# Patient Record
Sex: FEMALE | Race: WHITE | HISPANIC OR LATINO
[De-identification: names, ages, dates, MRNs, and addresses within clinical notes are randomized per-mention and may not be internally consistent; named-entity substitution may affect disease eponyms.]

---

## 2018-10-01 ENCOUNTER — APPOINTMENT (OUTPATIENT)
Dept: BREAST CENTER | Facility: CLINIC | Age: 53
End: 2018-10-01

## 2019-03-11 ENCOUNTER — APPOINTMENT (OUTPATIENT)
Dept: BREAST CENTER | Facility: CLINIC | Age: 54
End: 2019-03-11
Payer: MEDICAID

## 2019-03-11 VITALS
BODY MASS INDEX: 31.92 KG/M2 | DIASTOLIC BLOOD PRESSURE: 72 MMHG | HEART RATE: 67 BPM | WEIGHT: 187 LBS | SYSTOLIC BLOOD PRESSURE: 147 MMHG | HEIGHT: 64 IN

## 2019-03-11 DIAGNOSIS — Z78.9 OTHER SPECIFIED HEALTH STATUS: ICD-10-CM

## 2019-03-11 DIAGNOSIS — Z87.891 PERSONAL HISTORY OF NICOTINE DEPENDENCE: ICD-10-CM

## 2019-03-11 DIAGNOSIS — Z86.39 PERSONAL HISTORY OF OTHER ENDOCRINE, NUTRITIONAL AND METABOLIC DISEASE: ICD-10-CM

## 2019-03-11 PROCEDURE — 99215 OFFICE O/P EST HI 40 MIN: CPT

## 2019-03-11 RX ORDER — AMLODIPINE BESYLATE 5 MG/1
TABLET ORAL
Refills: 0 | Status: ACTIVE | COMMUNITY

## 2019-03-11 RX ORDER — ASPIRIN 81 MG
81 TABLET, DELAYED RELEASE (ENTERIC COATED) ORAL
Refills: 0 | Status: ACTIVE | COMMUNITY

## 2019-03-11 RX ORDER — CHROMIUM 200 MCG
TABLET ORAL
Refills: 0 | Status: ACTIVE | COMMUNITY

## 2019-03-11 RX ORDER — METHOTREXATE 2.5 MG/1
TABLET ORAL
Refills: 0 | Status: ACTIVE | COMMUNITY

## 2019-03-11 RX ORDER — LEVOTHYROXINE SODIUM 0.17 MG/1
TABLET ORAL
Refills: 0 | Status: ACTIVE | COMMUNITY

## 2019-03-11 RX ORDER — SERTRALINE HYDROCHLORIDE 100 MG/1
100 TABLET, FILM COATED ORAL
Refills: 0 | Status: ACTIVE | COMMUNITY

## 2019-03-11 RX ORDER — BUPROPION HYDROCHLORIDE 150 MG/1
150 TABLET, EXTENDED RELEASE ORAL
Refills: 0 | Status: ACTIVE | COMMUNITY

## 2019-03-11 NOTE — PHYSICAL EXAM
[Normocephalic] : normocephalic [Atraumatic] : atraumatic [Supple] : supple [No Supraclavicular Adenopathy] : no supraclavicular adenopathy [Examined in the supine and seated position] : examined in the supine and seated position [Asymmetrical] : asymmetrical [No dominant masses] : no dominant masses in right breast  [No dominant masses] : no dominant masses left breast [No Nipple Retraction] : no left nipple retraction [No Nipple Discharge] : no left nipple discharge [No Axillary Lymphadenopathy] : no left axillary lymphadenopathy [No Edema] : no edema [No Rashes] : no rashes [No Ulceration] : no ulceration [de-identified] : R>L [de-identified] : pinkish coloring periareolar central which resolves in supine position

## 2019-03-11 NOTE — ASSESSMENT
[FreeTextEntry1] : 55 yo high risk patient\par due for bilateral sono now\par plan MRI 5/2019 then JULY/AUG 2020\par We reviewed risk reduction strategies including maintaining a BMI <25, limiting red meat intake and alcoholic beverages to 3 per week and exercise (150 min/ week low intensity or 75 min/week high intensity). And maintaining a normal vitamin D level.\par \par f/u with me 6 months\par She knows to call or return sooner should any concerns or questions arise.\par

## 2019-03-11 NOTE — REVIEW OF SYSTEMS
[Recent Weight Gain (___ Lbs)] : recent [unfilled] ~Ulb weight gain [Palpitations] : palpitations [Heartburn] : heartburn [Joint Pain] : joint pain [Joint Swelling] : joint swelling [Anxiety] : anxiety [Depression] : depression [Hot Flashes] : hot flashes [Easy Bruising] : a tendency for easy bruising [Negative] : Heme/Lymph

## 2019-03-11 NOTE — HISTORY OF PRESENT ILLNESS
[FreeTextEntry1] : This is a 54 year old woman followed for high risk. She is  s/p right u/s bx 5/2012 [facility]: [facility]  Patient is s/p right breast MRI guided biopsy on 06/13/2016. Pathology showed benign mammary tissue with stromal fibrosclerosis. Her mother had bca for 2nd time in 03/2017. She states her mother was not genetically tested. Her  passed away August 2013. Her mother passed MARCH 2019.\par \par Patient had a CT scan on 01/30/2018, which showed nodularity along the inferior aspect of the breasts, further mammogram and usd recommended. Patient had diagnostic mammogram on 02/23/2018 and b/l breast usd on 03/02/2018 which showed no evidence of malignancy. Patient is schedule for breast MRI at the end of March. Patient has no breast complaints today.   \par \par She does SBE irregularly.\par She has not noticed a change in her breast or a breast lump.\par She has not noticed a change in her nipple or nipple area.\par She has not noticed a change in the skin of the breast.\par She is not experiencing nipple discharge.\par She is not experiencing breast pain.\par She has not noticed a lump or lymph node under the armpit. \par \par BREAST CANCER RISK FACTORS\par Menarche: 12\par Date of LMP: 3/7/2016\par Menopause: post \par Grav: 2     Para: 2\par Age at first live birth: 23\par Nursed: Yes\par Hysterectomy: No\par Oophorectomy: No\par OCP: yes 5 y\par HRT: No\par Last pap/pelvic exam: 01/2018 WNL; has appt 4/2019\par Related family history: Mother BCA at age 64 and 81yo, maternal uncle pancreatic CA 36\par Ashkenazi: No\par Mastery lifetime risk:  BRCAPRO 9.6%,  TC6 15.3%, TC7 37.4%, Maki 21.4%, Tung 7.3%; 5 year risk range: 1.1-5.2%; Maki 2.8%\par BRCA testing: Yes, ALISIA negative 8/2016\par  \par Last mammogram:    02/27/2019     Location: [place]\par Report reviewed.                                 Images reviewed on PACS.\par Results: Birads 2\par Heterogeneously dense breast. No evidence of malignancy\par \par Last ultrasound:   03/02/2018     Location: [place]\par Report reviewed.                                 Images reviewed on PACS. \par Results: Birads 2\par No evidence of malignancy\par \par Last MRI:     3/19/2018                                    Location: [facility] [place]\par Report reviewed.                                                   Images reviewed on PACS. \par Results: Birads 2\par No evidence of malignancy

## 2019-04-03 ENCOUNTER — RESULT REVIEW (OUTPATIENT)
Age: 54
End: 2019-04-03

## 2019-04-08 ENCOUNTER — MESSAGE (OUTPATIENT)
Age: 54
End: 2019-04-08

## 2019-09-06 ENCOUNTER — APPOINTMENT (OUTPATIENT)
Dept: VASCULAR SURGERY | Facility: CLINIC | Age: 54
End: 2019-09-06
Payer: MEDICAID

## 2019-09-06 PROCEDURE — 99214 OFFICE O/P EST MOD 30 MIN: CPT

## 2019-10-09 ENCOUNTER — APPOINTMENT (OUTPATIENT)
Dept: BREAST CENTER | Facility: CLINIC | Age: 54
End: 2019-10-09
Payer: MEDICAID

## 2019-10-09 VITALS
DIASTOLIC BLOOD PRESSURE: 70 MMHG | HEIGHT: 64 IN | WEIGHT: 183 LBS | BODY MASS INDEX: 31.24 KG/M2 | HEART RATE: 71 BPM | SYSTOLIC BLOOD PRESSURE: 132 MMHG

## 2019-10-09 PROCEDURE — 99215 OFFICE O/P EST HI 40 MIN: CPT

## 2019-10-09 RX ORDER — AMLODIPINE BESYLATE 2.5 MG/1
2.5 TABLET ORAL
Qty: 30 | Refills: 0 | Status: ACTIVE | COMMUNITY
Start: 2019-05-21

## 2019-10-09 RX ORDER — ATORVASTATIN CALCIUM 10 MG/1
10 TABLET, FILM COATED ORAL
Qty: 30 | Refills: 0 | Status: ACTIVE | COMMUNITY
Start: 2019-07-21

## 2019-10-09 RX ORDER — ATORVASTATIN CALCIUM 80 MG/1
TABLET, FILM COATED ORAL
Refills: 0 | Status: DISCONTINUED | COMMUNITY
End: 2019-10-09

## 2019-10-09 NOTE — PHYSICAL EXAM
[Normocephalic] : normocephalic [Atraumatic] : atraumatic [Supple] : supple [No Supraclavicular Adenopathy] : no supraclavicular adenopathy [Examined in the supine and seated position] : examined in the supine and seated position [Asymmetrical] : asymmetrical [No dominant masses] : no dominant masses in right breast  [No dominant masses] : no dominant masses left breast [No Nipple Retraction] : no left nipple retraction [No Nipple Discharge] : no left nipple discharge [No Axillary Lymphadenopathy] : no left axillary lymphadenopathy [No Edema] : no edema [No Rashes] : no rashes [No Ulceration] : no ulceration [de-identified] : R>L [de-identified] : pinkish coloring periareolar central which resolves in supine position

## 2019-10-09 NOTE — ASSESSMENT
[FreeTextEntry1] : 55 yo high risk patient\par plan bilateral mg/ sono FEB 2020\par plan MRI AUG 2020\par We reviewed risk reduction strategies including maintaining a BMI <25, limiting red meat intake and alcoholic beverages to 3 per week and exercise (150 min/ week low intensity or 75 min/week high intensity). And maintaining a normal vitamin D level.\par \par f/u with me 6 months\par She knows to call or return sooner should any concerns or questions arise.\par

## 2019-10-09 NOTE — HISTORY OF PRESENT ILLNESS
[FreeTextEntry1] : This is a 54 year old woman followed for high risk. She is  s/p right u/s bx 5/2012 path: [facility].  Patient is s/p right breast MRI guided biopsy on 06/13/2016. Pathology showed benign mammary tissue with stromal fibrosclerosis. Her mother had bca for 2nd time in 03/2017. She states her mother was not genetically tested. Her  passed away August 2013. Her mother passed MARCH 2019.\par \par Patient had a CT scan on 01/30/2018, which showed nodularity along the inferior aspect of the breasts, further mammogram and usd recommended. Patient had diagnostic mammogram on 02/23/2018 and b/l breast usd on 03/02/2018 which showed no evidence of malignancy. Patient is schedule for breast MRI at the end of March. Patient has no breast complaints today.   \par \par She does SBE irregularly.\par She has not noticed a change in her breast or a breast lump.\par She has not noticed a change in her nipple or nipple area.\par She has not noticed a change in the skin of the breast.\par She is not experiencing nipple discharge.\par She is not experiencing breast pain.\par She has not noticed a lump or lymph node under the armpit. \par \par BREAST CANCER RISK FACTORS\par Menarche: 12\par Date of LMP: 3/7/2016\par Menopause: post \par Grav: 2     Para: 2\par Age at first live birth: 23\par Nursed: Yes\par Hysterectomy: No\par Oophorectomy: No\par OCP: yes 5 y\par HRT: No\par Last pap/pelvic exam: 4/2019 WNL\par Related family history: Mother BCA at age 64 and 83yo, maternal uncle pancreatic CA 36\par Ashkenazi: No\par Mastery lifetime risk:  BRCAPRO 9.6%,  TC6 15.3%, TC7 37.4%, Maki 21.4%, Tung 7.3%; 5 year risk range: 1.1-5.2%; Maki 2.8%\par BRCA testing: Yes, LOIDASK negative 8/2016\par  \par Last mammogram:    02/27/2019     Location: [place]\par Report reviewed.                                 Images reviewed on PACS.\par Results: Birads 2\par Heterogeneously dense breast. No evidence of malignancy\par \par Last ultrasound:   4/9/2019     Location: [place]\par Report reviewed.                                 Images reviewed on PACS. \par Results: Birads 2\par No evidence of malignancy\par \par Last MRI:     6/12/2019                                    Location: [place]\par Report reviewed.                                                   Images reviewed on PACS. \par Results: Birads 2\par No evidence of malignancy

## 2020-06-25 ENCOUNTER — RESULT REVIEW (OUTPATIENT)
Age: 55
End: 2020-06-25

## 2020-06-29 ENCOUNTER — APPOINTMENT (OUTPATIENT)
Dept: BREAST CENTER | Facility: CLINIC | Age: 55
End: 2020-06-29
Payer: COMMERCIAL

## 2020-06-29 VITALS
BODY MASS INDEX: 30.56 KG/M2 | WEIGHT: 179 LBS | HEIGHT: 64 IN | DIASTOLIC BLOOD PRESSURE: 75 MMHG | HEART RATE: 85 BPM | SYSTOLIC BLOOD PRESSURE: 133 MMHG

## 2020-06-29 DIAGNOSIS — Z00.00 ENCOUNTER FOR GENERAL ADULT MEDICAL EXAMINATION W/OUT ABNORMAL FINDINGS: ICD-10-CM

## 2020-06-29 DIAGNOSIS — Z87.39 PERSONAL HISTORY OF OTHER DISEASES OF THE MUSCULOSKELETAL SYSTEM AND CONNECTIVE TISSUE: ICD-10-CM

## 2020-06-29 PROCEDURE — 99215 OFFICE O/P EST HI 40 MIN: CPT

## 2020-06-29 NOTE — ASSESSMENT
[FreeTextEntry1] : 54 yo high risk patient\par plan bilateral mg/ sono JUN 2021\par plan MRI OCT 2020\par We reviewed risk reduction strategies including maintaining a BMI <25, limiting red meat intake and alcoholic beverages to 3 per week and exercise (150 min/ week low intensity or 75 min/week high intensity). And maintaining a normal vitamin D level.\par \par f/u with me 6 months\par She knows to call or return sooner should any concerns or questions arise.\par

## 2020-06-29 NOTE — PHYSICAL EXAM
[Normocephalic] : normocephalic [No Supraclavicular Adenopathy] : no supraclavicular adenopathy [Supple] : supple [Atraumatic] : atraumatic [No dominant masses] : no dominant masses in right breast  [Asymmetrical] : asymmetrical [Examined in the supine and seated position] : examined in the supine and seated position [No Nipple Retraction] : no left nipple retraction [No dominant masses] : no dominant masses left breast [No Axillary Lymphadenopathy] : no left axillary lymphadenopathy [No Nipple Discharge] : no left nipple discharge [No Edema] : no edema [No Rashes] : no rashes [No Ulceration] : no ulceration [de-identified] : R>L [de-identified] : pinkish coloring periareolar central which resolves in supine position

## 2020-06-29 NOTE — HISTORY OF PRESENT ILLNESS
[FreeTextEntry1] : This is a 55 year old woman followed for high risk. She is  s/p right u/s bx 5/2012 path: [facility].  Patient is s/p right breast MRI guided biopsy on 06/13/2016. Pathology showed benign mammary tissue with stromal fibrosclerosis. Her mother had bca for 2nd time in 03/2017. She states her mother was not genetically tested. Her  passed away August 2013. Her mother passed MARCH 2019.\par \par Patient had a CT scan on 01/30/2018, which showed nodularity along the inferior aspect of the breasts, further mammogram and usd recommended. Patient had diagnostic mammogram on 02/23/2018 and b/l breast usd on 03/02/2018 which showed no evidence of malignancy. Patient is schedule for breast MRI at the end of March. Patient has no breast complaints today.   \par \par She does SBE irregularly.\par She has not noticed a change in her breast or a breast lump.\par She has not noticed a change in her nipple or nipple area.\par She has not noticed a change in the skin of the breast.\par She is not experiencing nipple discharge.\par She is not experiencing breast pain.\par She has not noticed a lump or lymph node under the armpit. \par \par BREAST CANCER RISK FACTORS\par Menarche: 12\par Date of LMP: 3/7/2016\par Menopause: post \par Grav: 2     Para: 2\par Age at first live birth: 23\par Nursed: Yes\par Hysterectomy: No\par Oophorectomy: No\par OCP: yes 5 y\par HRT: No\par Last pap/pelvic exam: 6/25/2020 pap results pending\par Related family history: Mother BCA at age 64 and 81yo, maternal uncle pancreatic CA 36\par Ashkenazi: No\par Mastery lifetime risk:  BRCAPRO 9.6%,  TC6 15.3%, TC7 37.4%, Maki 21.4%, Tung 7.3%; 5 year risk range: 1.1-5.2%; Maki 2.8%\par BRCA testing: Yes, ALISIA negative 8/2016\par  \par Last mammogram:  6/17/2020     [place]\par Report reviewed.                                 Images reviewed on PACS.\par Results: Birads 2\par Heterogeneously dense breast. No evidence of malignancy\par \par Last ultrasound:  6/17/2020     [place]\par Report reviewed.                                 Images reviewed on PACS. \par Results: Birads 2\par No evidence of malignancy\par \par Last MRI:     6/12/2019                                    Location: [place]\par Report reviewed.                                                   Images reviewed on PACS. \par Results: Birads 2\par No evidence of malignancy

## 2020-07-20 ENCOUNTER — TRANSCRIPTION ENCOUNTER (OUTPATIENT)
Age: 55
End: 2020-07-20

## 2020-12-21 ENCOUNTER — APPOINTMENT (OUTPATIENT)
Dept: BREAST CENTER | Facility: CLINIC | Age: 55
End: 2020-12-21
Payer: COMMERCIAL

## 2020-12-21 VITALS
DIASTOLIC BLOOD PRESSURE: 71 MMHG | BODY MASS INDEX: 30.56 KG/M2 | SYSTOLIC BLOOD PRESSURE: 137 MMHG | WEIGHT: 179 LBS | HEART RATE: 64 BPM | HEIGHT: 64 IN

## 2020-12-21 PROCEDURE — 99215 OFFICE O/P EST HI 40 MIN: CPT

## 2020-12-21 PROCEDURE — 99072 ADDL SUPL MATRL&STAF TM PHE: CPT

## 2020-12-21 NOTE — PHYSICAL EXAM
[Normocephalic] : normocephalic [Atraumatic] : atraumatic [Supple] : supple [No Supraclavicular Adenopathy] : no supraclavicular adenopathy [Examined in the supine and seated position] : examined in the supine and seated position [Asymmetrical] : asymmetrical [No dominant masses] : no dominant masses in right breast  [No dominant masses] : no dominant masses left breast [No Nipple Retraction] : no left nipple retraction [No Nipple Discharge] : no left nipple discharge [No Axillary Lymphadenopathy] : no right axillary lymphadenopathy [No Edema] : no edema [No Rashes] : no rashes [No Ulceration] : no ulceration [de-identified] : R>L [de-identified] : pinkish coloring periareolar central which resolves in supine position [de-identified] : soft palpable nodes left axilla

## 2020-12-21 NOTE — ASSESSMENT
[FreeTextEntry1] : 54 yo high risk patient\par plan bilateral mg/ sono JUN 2021\par left axillary u/s asap\par i will call after u/s review\par plan MRI DEC 2021\par We reviewed risk reduction strategies including maintaining a BMI <25, limiting red meat intake and alcoholic beverages to 3 per week and exercise (150 min/ week low intensity or 75 min/week high intensity). And maintaining a normal vitamin D level.\par \par f/u with me 6 months\par She knows to call or return sooner should any concerns or questions arise.\par

## 2020-12-21 NOTE — HISTORY OF PRESENT ILLNESS
[FreeTextEntry1] : This is a 55 year old woman followed for high risk. She is  s/p right u/s bx 5/2012 path: \[facility]\"".  Patient is s/p right breast MRI guided biopsy on 06/13/2016. Pathology showed benign mammary tissue with stromal fibrosclerosis. Her mother had bca for 2nd time in 03/2017. She states her mother was not genetically tested. Her  passed away August 2013. Her mother passed MARCH 2019.\par \par Patient had a CT scan on 01/30/2018, which showed nodularity along the inferior aspect of the breasts, further mammogram and usd recommended. Patient had diagnostic mammogram on 02/23/2018 and b/l breast usd on 03/02/2018 which showed no evidence of malignancy. Patient is schedule for breast MRI at the end of March. Patient has no breast complaints today.   \par \par She does SBE irregularly.\par She has not noticed a change in her breast or a breast lump.\par She has not noticed a change in her nipple or nipple area.\par She has not noticed a change in the skin of the breast.\par She is not experiencing nipple discharge.\par She is not experiencing breast pain.\par She has not noticed a lump or lymph node under the armpit. \par \par BREAST CANCER RISK FACTORS\par Menarche: 12\par Date of LMP: 3/7/2016\par Menopause: post \par Grav: 2     Para: 2\par Age at first live birth: 23\par Nursed: Yes\par Hysterectomy: No\par Oophorectomy: No\par OCP: yes 5 y\par HRT: No\par Last pap/pelvic exam: 6/25/2020 WNL \par Related family history: Mother BCA at age 64 and 81yo, maternal uncle pancreatic CA 36\par Ashkenazi: No\par Mastery lifetime risk:  BRCAPRO 9.6%,  TC6 15.3%, TC7 37.4%, Maki 21.4%, Tung 7.3%; 5 year risk range: 1.1-5.2%; Maki 2.8%\par BRCA testing: Yes, ALISIA negative 8/2016\par  \par Last mammogram:  6/17/2020     [place]\par Report reviewed.                                 Images reviewed on PACS.\par Results: Birads 2\par Heterogeneously dense breast. No evidence of malignancy\par \par Last ultrasound:  6/17/2020     [place]\par Report reviewed.                                 Images reviewed on PACS. \par Results: Birads 2\par No evidence of malignancy\par \par Last MRI:   11/10/2020                                    Location: [place]\par Report reviewed.                                            Images reviewed on PACS. \par Results: Birads 2\par No evidence of malignancy

## 2021-01-28 ENCOUNTER — NON-APPOINTMENT (OUTPATIENT)
Age: 56
End: 2021-01-28

## 2021-06-21 ENCOUNTER — APPOINTMENT (OUTPATIENT)
Dept: BREAST CENTER | Facility: CLINIC | Age: 56
End: 2021-06-21
Payer: COMMERCIAL

## 2021-06-21 ENCOUNTER — NON-APPOINTMENT (OUTPATIENT)
Age: 56
End: 2021-06-21

## 2021-06-21 VITALS
BODY MASS INDEX: 30.05 KG/M2 | HEART RATE: 80 BPM | WEIGHT: 176 LBS | HEIGHT: 64 IN | DIASTOLIC BLOOD PRESSURE: 79 MMHG | SYSTOLIC BLOOD PRESSURE: 136 MMHG

## 2021-06-21 PROCEDURE — 99214 OFFICE O/P EST MOD 30 MIN: CPT

## 2021-06-21 RX ORDER — ERGOCALCIFEROL 1.25 MG/1
1.25 MG CAPSULE, LIQUID FILLED ORAL
Qty: 4 | Refills: 0 | Status: ACTIVE | COMMUNITY
Start: 2021-05-03

## 2021-06-21 RX ORDER — TRIAMCINOLONE ACETONIDE 1 MG/G
0.1 CREAM TOPICAL
Qty: 454 | Refills: 0 | Status: ACTIVE | COMMUNITY
Start: 2021-03-26

## 2021-06-21 RX ORDER — DOXYCYCLINE HYCLATE 100 MG/1
100 CAPSULE ORAL
Qty: 30 | Refills: 0 | Status: COMPLETED | COMMUNITY
Start: 2021-04-26

## 2021-06-21 RX ORDER — AMOXICILLIN 500 MG/1
500 CAPSULE ORAL
Qty: 21 | Refills: 0 | Status: COMPLETED | COMMUNITY
Start: 2021-03-17

## 2021-06-21 NOTE — HISTORY OF PRESENT ILLNESS
[FreeTextEntry1] : This is a 56 year old woman followed for high risk. She is  s/p right u/s bx 5/2012 path: [facility].  Patient is s/p right breast MRI guided biopsy on 06/13/2016. Pathology showed benign mammary tissue with stromal fibrosclerosis. Her mother had bca for 2nd time in 03/2017. She states her mother was not genetically tested. Her  passed away August 2013. Her mother passed MARCH 2019.\par \par Patient had a CT scan on 01/30/2018, which showed nodularity along the inferior aspect of the breasts, further mammogram and usd recommended. Patient had diagnostic mammogram on 02/23/2018 and b/l breast usd on 03/02/2018 which showed no evidence of malignancy. Patient is schedule for breast MRI at the end of March. Patient has no breast complaints today.   \par She completed Pfizer left arm 4/2021. She is planning a trip to [facility] this summer.\par \par She does SBE irregularly.\par She has not noticed a change in her breast or a breast lump.\par She has not noticed a change in her nipple or nipple area.\par She has not noticed a change in the skin of the breast.\par She is not experiencing nipple discharge.\par She is not experiencing breast pain.\par She has not noticed a lump or lymph node under the armpit. \par \par BREAST CANCER RISK FACTORS\par Menarche: 12\par Date of LMP: 3/7/2016\par Menopause: post \par Grav: 2     Para: 2\par Age at first live birth: 23\par Nursed: Yes\par Hysterectomy: No\par Oophorectomy: No\par OCP: yes 5 y\par HRT: No\par Last pap/pelvic exam: 6/25/2020 WNL \par Related family history: Mother BCA at age 64 and 81yo, maternal uncle pancreatic CA 36\par Ashkenazi: No\par Mastery lifetime risk:  BRCAPRO 9.6%,  TC6 15.3%, TC7 37.4%, Maki 21.4%, Tung 7.3%; 5 year risk range: 1.1-5.2%; Maki 2.8%\par BRCA testing: Yes, ALISIA negative 8/2016\par  \par Last mammogram:  6/17/2021     [place]\par Report reviewed.                                 Images reviewed on PACS.\par Results: Birads 2\par Heterogeneously dense breast. No evidence of malignancy\par \par Last ultrasound:  6/17/2021     [place]\par Report reviewed.                                 Images reviewed on PACS. \par Results: Birads 2\par No evidence of malignancy\par \par Last MRI:   11/10/2020                                    Location: [place]\par Report reviewed.                                            Images reviewed on PACS. \par Results: Birads 2\par No evidence of malignancy\par \par

## 2021-06-21 NOTE — ASSESSMENT
[FreeTextEntry1] : 55 yo high risk patient\par plan bilateral mg/ sono JUN 2022\par \par plan MRI DEC 2021\par We reviewed risk reduction strategies including maintaining a BMI <25, limiting red meat intake and alcoholic beverages to 3 per week and exercise (150 min/ week low intensity or 75 min/week high intensity). And maintaining a normal vitamin D level.\par \par f/u with me 6 months\par She knows to call or return sooner should any concerns or questions arise.\par

## 2021-12-15 ENCOUNTER — APPOINTMENT (OUTPATIENT)
Dept: BREAST CENTER | Facility: CLINIC | Age: 56
End: 2021-12-15
Payer: MEDICAID

## 2021-12-15 VITALS
WEIGHT: 185 LBS | HEIGHT: 64 IN | HEART RATE: 61 BPM | BODY MASS INDEX: 31.58 KG/M2 | SYSTOLIC BLOOD PRESSURE: 156 MMHG | DIASTOLIC BLOOD PRESSURE: 79 MMHG

## 2021-12-15 DIAGNOSIS — R59.9 ENLARGED LYMPH NODES, UNSPECIFIED: ICD-10-CM

## 2021-12-15 DIAGNOSIS — Z80.1 FAMILY HISTORY OF MALIGNANT NEOPLASM OF TRACHEA, BRONCHUS AND LUNG: ICD-10-CM

## 2021-12-15 DIAGNOSIS — Z80.0 FAMILY HISTORY OF MALIGNANT NEOPLASM OF DIGESTIVE ORGANS: ICD-10-CM

## 2021-12-15 DIAGNOSIS — N63.10 UNSPECIFIED LUMP IN THE RIGHT BREAST, UNSPECIFIED QUADRANT: ICD-10-CM

## 2021-12-15 DIAGNOSIS — Z80.3 FAMILY HISTORY OF MALIGNANT NEOPLASM OF BREAST: ICD-10-CM

## 2021-12-15 PROCEDURE — 99214 OFFICE O/P EST MOD 30 MIN: CPT

## 2021-12-15 NOTE — HISTORY OF PRESENT ILLNESS
[FreeTextEntry1] : This is a 56 year old woman followed for high risk. She is  s/p right u/s bx 5/2012 path: [facility].  Patient is s/p right breast MRI guided biopsy on 06/13/2016. Pathology showed benign mammary tissue with stromal fibrosclerosis. Her mother had bca for 2nd time in 03/2017. She states her mother was not genetically tested. Her  passed away August 2013. Her mother passed MARCH 2019.\par \par Patient had a CT scan on 01/30/2018, which showed nodularity along the inferior aspect of the breasts, further mammogram and usd recommended. Patient had diagnostic mammogram on 02/23/2018 and b/l breast usd on 03/02/2018 which showed no evidence of malignancy. Patient is schedule for breast MRI at the end of March. Patient has no breast complaints today.   \par She completed Pfizer left arm 4/2021.  She had COVID booster left arm 1 month ago.\par \par She does SBE irregularly.\par She has not noticed a change in her breast or a breast lump.\par She has not noticed a change in her nipple or nipple area.\par She has not noticed a change in the skin of the breast.\par She is not experiencing nipple discharge.\par She is not experiencing breast pain.\par She has not noticed a lump or lymph node under the armpit. \par \par BREAST CANCER RISK FACTORS\par Menarche: 12\par Date of LMP: 3/7/2016\par Menopause: post \par Grav: 2     Para: 2\par Age at first live birth: 23\par Nursed: Yes\par Hysterectomy: No\par Oophorectomy: No\par OCP: yes 5 y\par HRT: No\par Last pap/pelvic exam: 6/25/2020 WNL \par Related family history: Mother BCA at age 64 and 81yo, maternal uncle pancreatic CA 36\par Ashkenazi: No\par Mastery lifetime risk:  BRCAPRO 9.6%,  TC6 15.3%, TC7 37.4%, Maki 21.4%, Tung 7.3%; 5 year risk range: 1.1-5.2%; Maki 2.8%\par BRCA testing: Yes, ALISIA negative 8/2016\par  \par Last mammogram:  6/17/2021     [place]\par Report reviewed.                                 Images reviewed on PACS.\par Results: Birads 2\par Heterogeneously dense breast. No evidence of malignancy\par \par Last ultrasound:  6/17/2021     [place]\par Report reviewed.                                 Images reviewed on PACS. \par Results: Birads 2\par No evidence of malignancy\par \par Last MRI:   11/10/2020                                    Location: [place]\par Report reviewed.                                            Images reviewed on PACS. \par Results: Birads 2\par No evidence of malignancy\par \par

## 2021-12-15 NOTE — PHYSICAL EXAM
[Normocephalic] : normocephalic [Atraumatic] : atraumatic [Supple] : supple [No Supraclavicular Adenopathy] : no supraclavicular adenopathy [Examined in the supine and seated position] : examined in the supine and seated position [Asymmetrical] : asymmetrical [No dominant masses] : no dominant masses in right breast  [No dominant masses] : no dominant masses left breast [No Nipple Retraction] : no left nipple retraction [No Nipple Discharge] : no left nipple discharge [No Axillary Lymphadenopathy] : no right axillary lymphadenopathy [No Edema] : no edema [No Rashes] : no rashes [No Ulceration] : no ulceration [de-identified] : R>L, midline 3:00 at sternum there is a nodularity which is mobile and nontender [de-identified] : pinkish coloring periareolar central which resolves in supine position [de-identified] : palpable node, soft

## 2021-12-15 NOTE — ASSESSMENT
[FreeTextEntry1] : 55 yo high risk patient\par plan bilateral mg/ sono JUN 2022\par \par plan MRI DEC 2021-has appt tomorrow\par We reviewed risk reduction strategies including maintaining a BMI <25, limiting red meat intake and alcoholic beverages to 3 per week and exercise (150 min/ week low intensity or 75 min/week high intensity). And maintaining a normal vitamin D level.\par \par f/u with me 6 months\par She knows to call or return sooner should any concerns or questions arise.\par

## 2021-12-17 ENCOUNTER — NON-APPOINTMENT (OUTPATIENT)
Age: 56
End: 2021-12-17

## 2022-01-11 ENCOUNTER — NON-APPOINTMENT (OUTPATIENT)
Age: 57
End: 2022-01-11

## 2022-03-03 NOTE — PHYSICAL EXAM
Dr. Chandler Michaud (Resident) performed a history and physical exam of the patient and we discussed the management plan.  I reviewed the Resident’s note and agree with the documented findings and plan of care.      Súal Sanabria MD     [Normocephalic] : normocephalic [Atraumatic] : atraumatic [Supple] : supple [No Supraclavicular Adenopathy] : no supraclavicular adenopathy [Examined in the supine and seated position] : examined in the supine and seated position [Asymmetrical] : asymmetrical [No dominant masses] : no dominant masses in right breast  [No dominant masses] : no dominant masses left breast [No Nipple Retraction] : no left nipple retraction [No Nipple Discharge] : no left nipple discharge [No Axillary Lymphadenopathy] : no right axillary lymphadenopathy [No Edema] : no edema [No Rashes] : no rashes [No Ulceration] : no ulceration [de-identified] : R>L [de-identified] : pinkish coloring periareolar central which resolves in supine position

## 2022-06-22 ENCOUNTER — APPOINTMENT (OUTPATIENT)
Dept: BREAST CENTER | Facility: CLINIC | Age: 57
End: 2022-06-22
Payer: MEDICAID

## 2022-06-22 VITALS
DIASTOLIC BLOOD PRESSURE: 72 MMHG | HEIGHT: 64 IN | HEART RATE: 60 BPM | WEIGHT: 185 LBS | BODY MASS INDEX: 31.58 KG/M2 | SYSTOLIC BLOOD PRESSURE: 131 MMHG

## 2022-06-22 PROCEDURE — 99214 OFFICE O/P EST MOD 30 MIN: CPT

## 2022-06-22 RX ORDER — GABAPENTIN 100 MG/1
100 CAPSULE ORAL
Qty: 60 | Refills: 0 | Status: DISCONTINUED | COMMUNITY
Start: 2020-08-05 | End: 2022-06-22

## 2022-06-22 NOTE — PHYSICAL EXAM
[Normocephalic] : normocephalic [Atraumatic] : atraumatic [Supple] : supple [No Supraclavicular Adenopathy] : no supraclavicular adenopathy [Examined in the supine and seated position] : examined in the supine and seated position [Asymmetrical] : asymmetrical [No dominant masses] : no dominant masses in right breast  [No dominant masses] : no dominant masses left breast [No Nipple Retraction] : no left nipple retraction [No Nipple Discharge] : no left nipple discharge [No Axillary Lymphadenopathy] : no right axillary lymphadenopathy [No Edema] : no edema [No Rashes] : no rashes [No Ulceration] : no ulceration [de-identified] : R>L, midline 3:00 at sternum there is a nodularity which is mobile and nontender [de-identified] : pinkish coloring periareolar central which resolves in supine position [de-identified] : palpable node, soft

## 2022-06-22 NOTE — ASSESSMENT
[FreeTextEntry1] : 58 yo high risk patient\par Will call patient with imaging results that were done today\par if negative plan mg/sono 6/2023\par plan MRI DEC 2022 \par We reviewed risk reduction strategies including maintaining a BMI <25, limiting red meat intake and alcoholic beverages to 3 per week and exercise (150 min/ week low intensity or 75 min/week high intensity). And maintaining a normal vitamin D level.\par \par f/u with me 1 year, gyn 6 months\par She knows to call or return sooner should any concerns or questions arise.\par

## 2022-06-22 NOTE — HISTORY OF PRESENT ILLNESS
[FreeTextEntry1] : This is a 57 year old woman followed for high risk. She is  s/p right u/s bx 5/2012 path: [facility].  Patient is s/p right breast MRI guided biopsy on 06/13/2016. Pathology showed benign mammary tissue with stromal fibrosclerosis. Her mother had bca for 2nd time in 03/2017. She states her mother was not genetically tested. Her  passed away August 2013. Her mother passed MARCH 2019.\par \par Patient had a CT scan on 01/30/2018, which showed nodularity along the inferior aspect of the breasts, further mammogram and usd recommended. Patient had diagnostic mammogram on 02/23/2018 and b/l breast usd on 03/02/2018 which showed no evidence of malignancy. Patient is schedule for breast MRI at the end of March.    \par \par Patient denies any new medical history. No new family history of cancer. Had physical earlier this year, possibly January 2022 with Dr. Juan A Sahu. Patient denies any breast complaints today.\par \par She does SBE irregularly.\par She has not noticed a change in her breast or a breast lump.\par She has not noticed a change in her nipple or nipple area.\par She has not noticed a change in the skin of the breast.\par She is not experiencing nipple discharge.\par She is not experiencing breast pain.\par She has not noticed a lump or lymph node under the armpit. \par \par BREAST CANCER RISK FACTORS\par Menarche: 12\par Date of LMP: 3/7/2016\par Menopause: post \par Grav: 2     Para: 2\par Age at first live birth: 23\par Nursed: Yes\par Hysterectomy: No\par Oophorectomy: No\par OCP: yes 5 y\par HRT: No\par Last pap/pelvic exam: 6/25/2020 WNL \par Related family history: Mother BCA at age 64 and 83yo, maternal uncle pancreatic CA 36\par Ashkenazi: No\par Mastery lifetime risk:  BRCAPRO 9.6%,  TC6 15.3%, TC7 37.4%, Maki 21.4%, Tung 7.3%; 5 year risk range: 1.1-5.2%; Maki 2.8%\par BRCA testing: Yes, LOIDASK negative 8/2016\par  \par Last mammogram:  6/22/2022         Location: [place]\par Report unavailable for review.           Images reviewed on PACS.\par \par Last ultrasound:  1/10/2022             Location: [place]\par Report unavailable for review. \par \par Last MRI: 12/16/2021                                    Location: [place]\par Report reviewed.                                            Images reviewed on PACS. \par Results: Birads 2\par No suspicious MRI abnormality and no interval change of an adverse nature.

## 2022-11-22 ENCOUNTER — NON-APPOINTMENT (OUTPATIENT)
Age: 57
End: 2022-11-22

## 2022-12-22 ENCOUNTER — RESULT REVIEW (OUTPATIENT)
Age: 57
End: 2022-12-22

## 2023-01-18 ENCOUNTER — TRANSCRIPTION ENCOUNTER (OUTPATIENT)
Age: 58
End: 2023-01-18

## 2023-07-28 ENCOUNTER — APPOINTMENT (OUTPATIENT)
Dept: BREAST CENTER | Facility: CLINIC | Age: 58
End: 2023-07-28
Payer: MEDICAID

## 2023-07-28 VITALS
BODY MASS INDEX: 32.44 KG/M2 | WEIGHT: 190 LBS | HEIGHT: 64 IN | HEART RATE: 77 BPM | DIASTOLIC BLOOD PRESSURE: 77 MMHG | SYSTOLIC BLOOD PRESSURE: 134 MMHG

## 2023-07-28 DIAGNOSIS — R92.2 INCONCLUSIVE MAMMOGRAM: ICD-10-CM

## 2023-07-28 DIAGNOSIS — Z12.31 ENCOUNTER FOR SCREENING MAMMOGRAM FOR MALIGNANT NEOPLASM OF BREAST: ICD-10-CM

## 2023-07-28 DIAGNOSIS — Z91.89 OTHER SPECIFIED PERSONAL RISK FACTORS, NOT ELSEWHERE CLASSIFIED: ICD-10-CM

## 2023-07-28 PROCEDURE — 99214 OFFICE O/P EST MOD 30 MIN: CPT

## 2023-07-31 PROBLEM — R92.2 DENSE BREAST: Status: ACTIVE | Noted: 2019-03-11

## 2023-07-31 PROBLEM — Z12.31 BREAST CANCER SCREENING BY MAMMOGRAM: Status: ACTIVE | Noted: 2019-03-11

## 2023-07-31 PROBLEM — Z91.89 AT HIGH RISK FOR BREAST CANCER: Status: ACTIVE | Noted: 2022-06-22

## 2023-07-31 NOTE — CONSULT LETTER
[Dear  ___] : Dear  [unfilled], [Courtesy Letter:] : I had the pleasure of seeing your patient, [unfilled], in my office today. [Please see my note below.] : Please see my note below. [Consult Closing:] : Thank you very much for allowing me to participate in the care of this patient.  If you have any questions, please do not hesitate to contact me. [Sincerely,] : Sincerely, [DrKaylee  ___] : Dr. ELIZABETH [FreeTextEntry3] : Kita Ross MS DO\par Breast Surgeon\par [facility] \[place] [address]\par

## 2023-07-31 NOTE — PHYSICAL EXAM
[Normocephalic] : normocephalic [Atraumatic] : atraumatic [Supple] : supple [No Supraclavicular Adenopathy] : no supraclavicular adenopathy [Examined in the supine and seated position] : examined in the supine and seated position [Asymmetrical] : asymmetrical [No dominant masses] : no dominant masses in right breast  [No dominant masses] : no dominant masses left breast [No Nipple Retraction] : no left nipple retraction [No Nipple Discharge] : no left nipple discharge [No Axillary Lymphadenopathy] : no right axillary lymphadenopathy [No Edema] : no edema [No Rashes] : no rashes [No Ulceration] : no ulceration [de-identified] : R>L, midline 3:00 at sternum there is a nodularity which is mobile and nontender [de-identified] : pinkish coloring periareolar central which resolves in supine position [de-identified] : palpable node, soft

## 2023-07-31 NOTE — HISTORY OF PRESENT ILLNESS
[FreeTextEntry1] : This is a 58 year old woman followed for high risk. She is  s/p right u/s bx 5/2012 path: [facility].  Patient is s/p right breast MRI guided biopsy on 06/13/2016. Pathology showed benign mammary tissue with stromal fibrosclerosis. Her mother had bca for 2nd time in 03/2017. She states her mother was not genetically tested. Her  passed away August 2013. Her mother passed MARCH 2019.\par \par Patient had a CT scan on 01/30/2018, which showed nodularity along the inferior aspect of the breasts, further mammogram and usd recommended. Patient had diagnostic mammogram on 02/23/2018 and b/l breast usd on 03/02/2018 which showed no evidence of malignancy. \par \par She was diagnosed with lymphoma 12/2022 and was treated with CHOP (end 5/2023) with Dr. Crockett. She has no breast complaints today.\par She is overdue for mg/sono.\par \par She does SBE irregularly.\par She has not noticed a change in her breast or a breast lump.\par She has not noticed a change in her nipple or nipple area.\par She has not noticed a change in the skin of the breast.\par She is not experiencing nipple discharge.\par She is not experiencing breast pain.\par She has not noticed a lump or lymph node under the armpit. \par \par BREAST CANCER RISK FACTORS\par Menarche: 12\par Date of LMP: 3/7/2016\par Menopause: post \par Grav: 2     Para: 2\par Age at first live birth: 23\par Nursed: Yes\par Hysterectomy: No\par Oophorectomy: No\par OCP: yes 5 y\par HRT: No\par Last pap/pelvic exam: 2022 WNL \par Related family history: Mother BCA at age 64 and 81 yo, maternal uncle pancreatic CA 36\par Ashkenazi: No\par Mastery lifetime risk:  BRCAPRO 9.6%,  TC6 15.3%, TC7 37.4%, Maki 21.4%, Tung 7.3%; 5 year risk range: 1.1-5.2%; Maki 2.8%\par BRCA testing: Yes, ALISIA negative 8/2016\par  \par Last mammogram:  6/22/2022         Location: [facility]\par Report reviewed.           Images reviewed on PACS.\par Results: BI-RADS 2\par Dense breasts, no evidence of malignancy. \par \par Last ultrasound: 6/22/2022            Location: [facility]\par Report reviewed. \par Results: BI-RADS 2\par No evidence of malignancy.\par \par Last MRI: 12/23/2022                                   Location: [facility]\par Report reviewed.                                            Images reviewed on PACS. \par Results: Birads 2\par No MRI evidence of malignancy.

## 2023-07-31 NOTE — ASSESSMENT
[FreeTextEntry1] : 57 yo high risk patient\par Will call patient with imaging results that were done today\par if negative plan mg/sono 6/2023-she will call for appt asap\par plan MRI DEC [id number]\par \par We reviewed risk reduction strategies including maintaining a BMI <25, limiting red meat intake and alcoholic beverages to 3 per week and exercise (150 min/ week low intensity or 75 min/week high intensity). And maintaining a normal vitamin D level.\par \par f/u with me 1 year, gyn 6 months\par She knows to call or return sooner should any concerns or questions arise.\par

## 2023-09-19 ENCOUNTER — RESULT REVIEW (OUTPATIENT)
Age: 58
End: 2023-09-19

## 2023-12-11 ENCOUNTER — NON-APPOINTMENT (OUTPATIENT)
Age: 58
End: 2023-12-11

## 2023-12-25 ENCOUNTER — RESULT REVIEW (OUTPATIENT)
Age: 58
End: 2023-12-25

## 2024-07-29 ENCOUNTER — APPOINTMENT (OUTPATIENT)
Dept: BREAST CENTER | Facility: CLINIC | Age: 59
End: 2024-07-29

## 2024-09-23 PROBLEM — R92.30 DENSE BREAST: Status: RESOLVED | Noted: 2019-03-11 | Resolved: 2024-09-23

## 2024-09-24 ENCOUNTER — APPOINTMENT (OUTPATIENT)
Dept: BREAST CENTER | Facility: CLINIC | Age: 59
End: 2024-09-24
Payer: MEDICAID

## 2024-09-24 VITALS
BODY MASS INDEX: 31.76 KG/M2 | SYSTOLIC BLOOD PRESSURE: 135 MMHG | DIASTOLIC BLOOD PRESSURE: 73 MMHG | WEIGHT: 186 LBS | HEIGHT: 64 IN | HEART RATE: 70 BPM

## 2024-09-24 DIAGNOSIS — Z91.89 OTHER SPECIFIED PERSONAL RISK FACTORS, NOT ELSEWHERE CLASSIFIED: ICD-10-CM

## 2024-09-24 DIAGNOSIS — R92.30 INCONCLUSIVE MAMMOGRAM: ICD-10-CM

## 2024-09-24 DIAGNOSIS — R92.30 DENSE BREASTS, UNSPECIFIED: ICD-10-CM

## 2024-09-24 DIAGNOSIS — Z12.31 ENCOUNTER FOR SCREENING MAMMOGRAM FOR MALIGNANT NEOPLASM OF BREAST: ICD-10-CM

## 2024-09-24 DIAGNOSIS — R92.2 INCONCLUSIVE MAMMOGRAM: ICD-10-CM

## 2024-09-24 PROCEDURE — 99214 OFFICE O/P EST MOD 30 MIN: CPT

## 2024-09-24 NOTE — HISTORY OF PRESENT ILLNESS
[FreeTextEntry1] : This is a 58 year old woman followed for high risk. She is  s/p right [facility]/s bx 5[facility]2012 [facility]  Patient is s/p right breast MRI guided biopsy on 06/13/2016. Pathology showed benign mammary tissue with stromal fibrosclerosis. Her mother had bca for 2nd time in 3/2017. She states her mother was not genetically tested. Her  passed away August 2013. Her mother passed MARCH 2019.  Patient had a CT scan on 01/30/2018, which showed nodularity along the inferior aspect of the breasts, further mammogram and usd recommended. Patient had diagnostic mammogram on 02/23/2018 and b/l breast usd on 03/02/2018 which showed no evidence of malignancy.   She was diagnosed with lymphoma 12/2022 and was treated with CHOP (end 5/2023) with Dr. Crockett. She has no breast complaints today. She is overdue for mg/sono.  She does SBE irregularly. She has not noticed a change in her breast or a breast lump. She has not noticed a change in her nipple or nipple area. She has not noticed a change in the skin of the breast. She is not experiencing nipple discharge. She is not experiencing breast pain. She has not noticed a lump or lymph node under the armpit.   BREAST CANCER RISK FACTORS Menarche: 12 Date of LMP: 3/7/2016 Menopause: post  Grav: 2     Para: 2 Age at first live birth: 23 Nursed: Yes Hysterectomy: No Oophorectomy: No OCP: yes 5 y HRT: No Last pap/pelvic exam:  Related family history: Mother BCA at age 64 and 83 yo, maternal uncle pancreatic CA 36 Ashkenazi: No Mastery lifetime risk:  BRCAPRO 9.6%,  TC6 15.3%, TC7 37.4%, Maki 21.4%, Tung 7.3%; 5 year risk range: 1.1-5.2%; Maki 2.8% BRCA testing: Yes, ALISIA negative 8/2016   Last mammogram:  09/20/2023         Location: [facility] Report reviewed.           Images reviewed on PACS. Results: BI-RADS 2 Dense breasts, no evidence of malignancy.   Last ultrasound: 09/20/2023            Location: [facility] Report reviewed.  Results: BI-RADS 2 No evidence of malignancy.  Last MRI: 12/26/2023                                   Location: [facility] Report reviewed.                                            Images reviewed on PACS.  Results: Birads 2 No MRI evidence of malignancy.

## 2024-09-24 NOTE — PHYSICAL EXAM
[Normocephalic] : normocephalic [Atraumatic] : atraumatic [Supple] : supple [No Supraclavicular Adenopathy] : no supraclavicular adenopathy [Examined in the supine and seated position] : examined in the supine and seated position [Asymmetrical] : asymmetrical [No dominant masses] : no dominant masses in right breast  [No dominant masses] : no dominant masses left breast [No Nipple Retraction] : no left nipple retraction [No Nipple Discharge] : no left nipple discharge [No Axillary Lymphadenopathy] : no right axillary lymphadenopathy [No Edema] : no edema [No Rashes] : no rashes [No Ulceration] : no ulceration [de-identified] : R>L, midline 3:00 at sternum there is no longer a nodularity which is mobile and nontender [de-identified] : pinkish coloring periareolar central which resolves in supine position [de-identified] : palpable node, soft

## 2024-09-24 NOTE — CONSULT LETTER
[Dear  ___] : Dear  [unfilled], [Courtesy Letter:] : I had the pleasure of seeing your patient, [unfilled], in my office today. [Please see my note below.] : Please see my note below. [Consult Closing:] : Thank you very much for allowing me to participate in the care of this patient.  If you have any questions, please do not hesitate to contact me. [Sincerely,] : Sincerely, [DrKaylee  ___] : Dr. ELIZABETH [FreeTextEntry3] : Kita Ross MS DO\par  Breast Surgeon\par  [facility] \[place] [address]\par

## 2024-09-24 NOTE — PHYSICAL EXAM
[Normocephalic] : normocephalic [Atraumatic] : atraumatic [Supple] : supple [No Supraclavicular Adenopathy] : no supraclavicular adenopathy [Examined in the supine and seated position] : examined in the supine and seated position [Asymmetrical] : asymmetrical [No dominant masses] : no dominant masses in right breast  [No dominant masses] : no dominant masses left breast [No Nipple Retraction] : no left nipple retraction [No Nipple Discharge] : no left nipple discharge [No Axillary Lymphadenopathy] : no right axillary lymphadenopathy [No Edema] : no edema [No Rashes] : no rashes [No Ulceration] : no ulceration [de-identified] : R>L, midline 3:00 at sternum there is no longer a nodularity which is mobile and nontender [de-identified] : pinkish coloring periareolar central which resolves in supine position [de-identified] : palpable node, soft

## 2024-09-24 NOTE — HISTORY OF PRESENT ILLNESS
[FreeTextEntry1] : This is a 58 year old woman followed for high risk. She is  s/p right u/s bx 5/2012 path: [facility].  Patient is s/p right breast MRI guided biopsy on 06/13/2016. Pathology showed benign mammary tissue with stromal fibrosclerosis. Her mother had bca for 2nd time in 3/2017. She states her mother was not genetically tested. Her  passed away August 2013. Her mother passed MARCH 2019.  Patient had a CT scan on 01/30/2018, which showed nodularity along the inferior aspect of the breasts, further mammogram and usd recommended. Patient had diagnostic mammogram on 02/23/2018 and b/l breast usd on 03/02/2018 which showed no evidence of malignancy.   She was diagnosed with lymphoma 12/2022 and was treated with CHOP (end 5/2023) with Dr. Crockett. She has no breast complaints today. She is overdue for mg/sono.  She does SBE irregularly. She has not noticed a change in her breast or a breast lump. She has not noticed a change in her nipple or nipple area. She has not noticed a change in the skin of the breast. She is not experiencing nipple discharge. She is not experiencing breast pain. She has not noticed a lump or lymph node under the armpit.   BREAST CANCER RISK FACTORS Menarche: 12 Date of LMP: 3/7/2016 Menopause: post  Grav: 2     Para: 2 Age at first live birth: 23 Nursed: Yes Hysterectomy: No Oophorectomy: No OCP: yes 5 y HRT: No Last pap/pelvic exam:  Related family history: Mother BCA at age 64 and 83 yo, maternal uncle pancreatic CA 36 Ashkenazi: No Mastery lifetime risk:  BRCAPRO 9.6%,  TC6 15.3%, TC7 37.4%, Maki 21.4%, Tung 7.3%; 5 year risk range: 1.1-5.2%; Maki 2.8% BRCA testing: Yes, ALISIA negative 8/2016   Last mammogram:  09/20/2023         Location: [facility] Report reviewed.           Images reviewed on PACS. Results: BI-RADS 2 Dense breasts, no evidence of malignancy.   Last ultrasound: 09/20/2023            Location: [facility] Report reviewed.  Results: BI-RADS 2 No evidence of malignancy.  Last MRI: 12/26/2023                                   Location: [facility] Report reviewed.                                            Images reviewed on PACS.  Results: Birads 2 No MRI evidence of malignancy.

## 2024-09-24 NOTE — HISTORY OF PRESENT ILLNESS
[FreeTextEntry1] : This is a 58 year old woman followed for high risk. She is  s/p right u/s bx 5/2012 path: [facility].  Patient is s/p right breast MRI guided biopsy on 06/13/2016. Pathology showed benign mammary tissue with stromal fibrosclerosis. Her mother had bca for 2nd time in 3/2017. She states her mother was not genetically tested. Her  passed away August 2013. Her mother passed MARCH 2019.  Patient had a CT scan on 01/30/2018, which showed nodularity along the inferior aspect of the breasts, further mammogram and usd recommended. Patient had diagnostic mammogram on 02/23/2018 and b/l breast usd on 03/02/2018 which showed no evidence of malignancy.   She was diagnosed with lymphoma 12/2022 and was treated with CHOP (end 5/2023) with Dr. Crockett. She has no breast complaints today. She is overdue for mg/sono.  She does SBE irregularly. She has not noticed a change in her breast or a breast lump. She has not noticed a change in her nipple or nipple area. She has not noticed a change in the skin of the breast. She is not experiencing nipple discharge. She is not experiencing breast pain. She has not noticed a lump or lymph node under the armpit.   BREAST CANCER RISK FACTORS Menarche: 12 Date of LMP: 3/7/2016 Menopause: post  Grav: 2     Para: 2 Age at first live birth: 23 Nursed: Yes Hysterectomy: No Oophorectomy: No OCP: yes 5 y HRT: No Last pap/pelvic exam:  Related family history: Mother BCA at age 64 and 83 yo, maternal uncle pancreatic CA 36 Ashkenazi: No Mastery lifetime risk:  BRCAPRO 9.6%,  TC6 15.3%, TC7 37.4%, Maki 21.4%, Tung 7.3%; 5 year risk range: 1.1-5.2%; Maki 2.8% BRCA testing: Yes, ALISIA negative 8/2016   Last mammogram:  09/20/2023         Location: [facility] Report reviewed.           Images reviewed on PACS. Results: BI-RADS 2 Dense breasts, no evidence of malignancy.   Last ultrasound: 09/20/2023            Location: [facility] Report reviewed.  Results: BI-RADS 2 No evidence of malignancy.  Last MRI: 12/26/2023                                   Location: [facility] Report reviewed.                                            Images reviewed on PACS.  Results: Birads 2 No MRI evidence of malignancy.

## 2024-09-24 NOTE — ASSESSMENT
[FreeTextEntry1] : 60 yo high risk patient  plan mg/sono asap plan MRI 3/2025 We reviewed risk reduction strategies including maintaining a BMI <25, limiting red meat intake and alcoholic beverages to 3 per week and exercise (150 min/ week low intensity or 75 min/week high intensity). And maintaining a normal vitamin D level.  f/u with me 1 year, gyn 6 months She knows to call or return sooner should any concerns or questions arise.

## 2024-09-24 NOTE — PHYSICAL EXAM
[Normocephalic] : normocephalic [Atraumatic] : atraumatic [Supple] : supple [No Supraclavicular Adenopathy] : no supraclavicular adenopathy [Examined in the supine and seated position] : examined in the supine and seated position [Asymmetrical] : asymmetrical [No dominant masses] : no dominant masses in right breast  [No dominant masses] : no dominant masses left breast [No Nipple Retraction] : no left nipple retraction [No Nipple Discharge] : no left nipple discharge [No Axillary Lymphadenopathy] : no right axillary lymphadenopathy [No Edema] : no edema [No Rashes] : no rashes [No Ulceration] : no ulceration [de-identified] : R>L, midline 3:00 at sternum there is no longer a nodularity which is mobile and nontender [de-identified] : pinkish coloring periareolar central which resolves in supine position [de-identified] : palpable node, soft

## 2024-09-24 NOTE — CONSULT LETTER
[Dear  ___] : Dear  [unfilled], [Courtesy Letter:] : I had the pleasure of seeing your patient, [unfilled], in my office today. [Please see my note below.] : Please see my note below. [Consult Closing:] : Thank you very much for allowing me to participate in the care of this patient.  If you have any questions, please do not hesitate to contact me. [Sincerely,] : Sincerely, [DrKaylee  ___] : Dr. ELIZABETH [FreeTextEntry3] : Kita Ross MS DO\par  Breast Surgeon\par  [facility] \[place] [address]\par

## 2024-09-24 NOTE — CONSULT LETTER
[Dear  ___] : Dear  [unfilled], [Courtesy Letter:] : I had the pleasure of seeing your patient, [unfilled], in my office today. [Please see my note below.] : Please see my note below. [Consult Closing:] : Thank you very much for allowing me to participate in the care of this patient.  If you have any questions, please do not hesitate to contact me. [Sincerely,] : Sincerely, [DrKaylee  ___] : Dr. ELIZABETH [FreeTextEntry3] : Kita Ross MS DO\par  Breast Surgeon\par  [facility] \[place] [address]\par

## 2024-11-12 ENCOUNTER — RESULT REVIEW (OUTPATIENT)
Age: 59
End: 2024-11-12

## 2024-11-15 ENCOUNTER — NON-APPOINTMENT (OUTPATIENT)
Age: 59
End: 2024-11-15

## 2024-11-15 ENCOUNTER — RESULT REVIEW (OUTPATIENT)
Age: 59
End: 2024-11-15

## 2024-11-15 DIAGNOSIS — R92.8 OTHER ABNORMAL AND INCONCLUSIVE FINDINGS ON DIAGNOSTIC IMAGING OF BREAST: ICD-10-CM

## 2025-06-30 ENCOUNTER — NON-APPOINTMENT (OUTPATIENT)
Age: 60
End: 2025-06-30

## 2025-08-18 ENCOUNTER — RESULT REVIEW (OUTPATIENT)
Age: 60
End: 2025-08-18

## 2025-08-27 ENCOUNTER — RESULT REVIEW (OUTPATIENT)
Age: 60
End: 2025-08-27